# Patient Record
(demographics unavailable — no encounter records)

---

## 2024-12-08 NOTE — HISTORY OF PRESENT ILLNESS
[FreeTextEntry1] : npv- FBSE [de-identified] : Bharati Vizcarra 31 y/o F presents for FBSE.   Lists the following concerns today: - Irritated moles on chin, neck, and back . from n95 mask and bra. 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

## 2024-12-08 NOTE — HISTORY OF PRESENT ILLNESS
[FreeTextEntry1] : npv- FBSE [de-identified] : Bharati Vizcarra 29 y/o F presents for FBSE.   Lists the following concerns today: - Irritated moles on chin, neck, and back . from n95 mask and bra. 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

## 2024-12-08 NOTE — ASSESSMENT
[FreeTextEntry1] : #Skin cancer screening  Sun protection reviewed. The patient was educated regarding appropriate sun protection measures, including wearing sunscreen with SPF 30 or higher when outdoors, sun protective clothing, and sun avoidance.   #Benign appearing nevi #Solar lentigines Patient was reassured of the benign nature of these findings. No further treatment needed at this time. If any lesion changes or becomes symptomatic I recommend follow-up  #photoaging skin #Acne -skincare reviewed including consistent use of SPF, vitamin C serum, moisturizer. -start tretinoin .025 TIW . SED including skin irritation and peeling. Discontinue if pregnant or breastfeeding. Proper application technique reviewed.  #Favor IDN- chin #Favor IDN- neck #Favor IDN - back #Itching Clinical and dermoscopy pattern appear benign today.  Pt interested in removal because they have been repeated symptomatic and caused itching/pain when rubbing against her mask and bra Since they are causing symptoms, I suggested punch excisions. Risk and benefits discussed, I emphasized that this will result in scars. She would like to schedule follow up for procedure -for flares: start desonide .05% oint bid for two weeks on and two weeks off as needed for itching.

## 2024-12-08 NOTE — PHYSICAL EXAM
[FreeTextEntry3] : On the trunk and upper/lower extremities bilaterally, are multiple scattered tan to brown macules and papules with regular borders. Some of these were examined dermoscopically and had reassuring features.  -Scattered tan smooth macules with regular borders and pigmentation. Many of these were examined with dermoscopy and had benign features.  chin, ,  neck, back: raised soft flesh colored papules with comma shaped vessels and benign appearing patterns on dermoscopy  face: few comedones

## 2024-12-18 NOTE — ASSESSMENT
[FreeTextEntry1] :  1) Tumor of Uncertain Behavior -x3 a) LUB b) L neck c) R chin  Differential Diagnosis:  a) IDN b) IDN c) IDN.  Photographs obtained.   Recommended lesion removal by punch technique.  Reason: irritated lesions.   A) Excision by 4mm Punch Technique with Simple Closure Procedure Note.   Lesion size: 4mm    Cleaned with: alcohol wipe Closure Length: 0.4cm. Simple closure: 5-0 fast gut interrupted skin sutures.   b) Excision by 3mm Punch Technique with Simple Closure Procedure Note.   Lesion size: 3mm    Cleaned with: alcohol wipe Closure Length: 0.3 cm. Simple closure: 5-0 fast gut interrupted skin sutures.   c) Excision by 3mm Punch Technique with Simple Closure Procedure Note.   Lesion size: 3mm    Cleaned with: alcohol wipe Closure Length: 0.3 cm. Simple closure: 5-0 fast gut i interrupted skin sutures.   After discussion of risks, written informed consent was obtained.  a time out was performed.   Following intradermal injection of 1% lidocaine with 1:1000,000 epinephrine, an excision by punch technique was performed down to the subcutaneous tissue.   A bandage was placed and wound care was reviewed with the patient. The specimen was sent to Pathology for review.  Estimated blood loss: Minimal. Complications: none.